# Patient Record
Sex: FEMALE | Race: WHITE | HISPANIC OR LATINO
[De-identification: names, ages, dates, MRNs, and addresses within clinical notes are randomized per-mention and may not be internally consistent; named-entity substitution may affect disease eponyms.]

---

## 2018-12-03 ENCOUNTER — TRANSCRIPTION ENCOUNTER (OUTPATIENT)
Age: 28
End: 2018-12-03

## 2023-11-30 ENCOUNTER — EMERGENCY (EMERGENCY)
Facility: HOSPITAL | Age: 33
LOS: 1 days | Discharge: HOME | End: 2023-11-30
Admitting: EMERGENCY MEDICINE
Payer: COMMERCIAL

## 2023-11-30 VITALS
DIASTOLIC BLOOD PRESSURE: 90 MMHG | TEMPERATURE: 98 F | HEART RATE: 82 BPM | SYSTOLIC BLOOD PRESSURE: 133 MMHG | RESPIRATION RATE: 18 BRPM | OXYGEN SATURATION: 100 %

## 2023-11-30 DIAGNOSIS — F41.9 ANXIETY DISORDER, UNSPECIFIED: ICD-10-CM

## 2023-11-30 DIAGNOSIS — F60.3 BORDERLINE PERSONALITY DISORDER: ICD-10-CM

## 2023-11-30 DIAGNOSIS — F32.9 MAJOR DEPRESSIVE DISORDER, SINGLE EPISODE, UNSPECIFIED: ICD-10-CM

## 2023-11-30 LAB
ALBUMIN SERPL ELPH-MCNC: 4.2 G/DL — SIGNIFICANT CHANGE UP (ref 3.3–5)
ALBUMIN SERPL ELPH-MCNC: 4.2 G/DL — SIGNIFICANT CHANGE UP (ref 3.3–5)
ALP SERPL-CCNC: 72 U/L — SIGNIFICANT CHANGE UP (ref 40–120)
ALP SERPL-CCNC: 72 U/L — SIGNIFICANT CHANGE UP (ref 40–120)
ALT FLD-CCNC: 12 U/L — SIGNIFICANT CHANGE UP (ref 4–33)
ALT FLD-CCNC: 12 U/L — SIGNIFICANT CHANGE UP (ref 4–33)
AMPHET UR-MCNC: NEGATIVE — SIGNIFICANT CHANGE UP
AMPHET UR-MCNC: NEGATIVE — SIGNIFICANT CHANGE UP
ANION GAP SERPL CALC-SCNC: 12 MMOL/L — SIGNIFICANT CHANGE UP (ref 7–14)
ANION GAP SERPL CALC-SCNC: 12 MMOL/L — SIGNIFICANT CHANGE UP (ref 7–14)
APAP SERPL-MCNC: <10 UG/ML — LOW (ref 15–25)
APAP SERPL-MCNC: <10 UG/ML — LOW (ref 15–25)
APPEARANCE UR: CLEAR — SIGNIFICANT CHANGE UP
APPEARANCE UR: CLEAR — SIGNIFICANT CHANGE UP
AST SERPL-CCNC: 25 U/L — SIGNIFICANT CHANGE UP (ref 4–32)
AST SERPL-CCNC: 25 U/L — SIGNIFICANT CHANGE UP (ref 4–32)
BACTERIA # UR AUTO: NEGATIVE /HPF — SIGNIFICANT CHANGE UP
BACTERIA # UR AUTO: NEGATIVE /HPF — SIGNIFICANT CHANGE UP
BARBITURATES UR SCN-MCNC: NEGATIVE — SIGNIFICANT CHANGE UP
BARBITURATES UR SCN-MCNC: NEGATIVE — SIGNIFICANT CHANGE UP
BASOPHILS # BLD AUTO: 0.04 K/UL — SIGNIFICANT CHANGE UP (ref 0–0.2)
BASOPHILS # BLD AUTO: 0.04 K/UL — SIGNIFICANT CHANGE UP (ref 0–0.2)
BASOPHILS NFR BLD AUTO: 0.7 % — SIGNIFICANT CHANGE UP (ref 0–2)
BASOPHILS NFR BLD AUTO: 0.7 % — SIGNIFICANT CHANGE UP (ref 0–2)
BENZODIAZ UR-MCNC: NEGATIVE — SIGNIFICANT CHANGE UP
BENZODIAZ UR-MCNC: NEGATIVE — SIGNIFICANT CHANGE UP
BILIRUB SERPL-MCNC: 0.4 MG/DL — SIGNIFICANT CHANGE UP (ref 0.2–1.2)
BILIRUB SERPL-MCNC: 0.4 MG/DL — SIGNIFICANT CHANGE UP (ref 0.2–1.2)
BILIRUB UR-MCNC: NEGATIVE — SIGNIFICANT CHANGE UP
BILIRUB UR-MCNC: NEGATIVE — SIGNIFICANT CHANGE UP
BUN SERPL-MCNC: 7 MG/DL — SIGNIFICANT CHANGE UP (ref 7–23)
BUN SERPL-MCNC: 7 MG/DL — SIGNIFICANT CHANGE UP (ref 7–23)
CALCIUM SERPL-MCNC: 9.4 MG/DL — SIGNIFICANT CHANGE UP (ref 8.4–10.5)
CALCIUM SERPL-MCNC: 9.4 MG/DL — SIGNIFICANT CHANGE UP (ref 8.4–10.5)
CAST: 0 /LPF — SIGNIFICANT CHANGE UP (ref 0–4)
CAST: 0 /LPF — SIGNIFICANT CHANGE UP (ref 0–4)
CHLORIDE SERPL-SCNC: 101 MMOL/L — SIGNIFICANT CHANGE UP (ref 98–107)
CHLORIDE SERPL-SCNC: 101 MMOL/L — SIGNIFICANT CHANGE UP (ref 98–107)
CO2 SERPL-SCNC: 24 MMOL/L — SIGNIFICANT CHANGE UP (ref 22–31)
CO2 SERPL-SCNC: 24 MMOL/L — SIGNIFICANT CHANGE UP (ref 22–31)
COCAINE METAB.OTHER UR-MCNC: NEGATIVE — SIGNIFICANT CHANGE UP
COCAINE METAB.OTHER UR-MCNC: NEGATIVE — SIGNIFICANT CHANGE UP
COLOR SPEC: YELLOW — SIGNIFICANT CHANGE UP
COLOR SPEC: YELLOW — SIGNIFICANT CHANGE UP
CREAT SERPL-MCNC: 0.6 MG/DL — SIGNIFICANT CHANGE UP (ref 0.5–1.3)
CREAT SERPL-MCNC: 0.6 MG/DL — SIGNIFICANT CHANGE UP (ref 0.5–1.3)
CREATININE URINE RESULT, DAU: 58 MG/DL — SIGNIFICANT CHANGE UP
CREATININE URINE RESULT, DAU: 58 MG/DL — SIGNIFICANT CHANGE UP
DIFF PNL FLD: NEGATIVE — SIGNIFICANT CHANGE UP
DIFF PNL FLD: NEGATIVE — SIGNIFICANT CHANGE UP
EGFR: 123 ML/MIN/1.73M2 — SIGNIFICANT CHANGE UP
EGFR: 123 ML/MIN/1.73M2 — SIGNIFICANT CHANGE UP
EOSINOPHIL # BLD AUTO: 0.03 K/UL — SIGNIFICANT CHANGE UP (ref 0–0.5)
EOSINOPHIL # BLD AUTO: 0.03 K/UL — SIGNIFICANT CHANGE UP (ref 0–0.5)
EOSINOPHIL NFR BLD AUTO: 0.5 % — SIGNIFICANT CHANGE UP (ref 0–6)
EOSINOPHIL NFR BLD AUTO: 0.5 % — SIGNIFICANT CHANGE UP (ref 0–6)
ETHANOL SERPL-MCNC: <10 MG/DL — SIGNIFICANT CHANGE UP
ETHANOL SERPL-MCNC: <10 MG/DL — SIGNIFICANT CHANGE UP
GLUCOSE SERPL-MCNC: 89 MG/DL — SIGNIFICANT CHANGE UP (ref 70–99)
GLUCOSE SERPL-MCNC: 89 MG/DL — SIGNIFICANT CHANGE UP (ref 70–99)
GLUCOSE UR QL: NEGATIVE MG/DL — SIGNIFICANT CHANGE UP
GLUCOSE UR QL: NEGATIVE MG/DL — SIGNIFICANT CHANGE UP
HCT VFR BLD CALC: 42.3 % — SIGNIFICANT CHANGE UP (ref 34.5–45)
HCT VFR BLD CALC: 42.3 % — SIGNIFICANT CHANGE UP (ref 34.5–45)
HGB BLD-MCNC: 13.9 G/DL — SIGNIFICANT CHANGE UP (ref 11.5–15.5)
HGB BLD-MCNC: 13.9 G/DL — SIGNIFICANT CHANGE UP (ref 11.5–15.5)
IANC: 3.86 K/UL — SIGNIFICANT CHANGE UP (ref 1.8–7.4)
IANC: 3.86 K/UL — SIGNIFICANT CHANGE UP (ref 1.8–7.4)
IMM GRANULOCYTES NFR BLD AUTO: 0.3 % — SIGNIFICANT CHANGE UP (ref 0–0.9)
IMM GRANULOCYTES NFR BLD AUTO: 0.3 % — SIGNIFICANT CHANGE UP (ref 0–0.9)
KETONES UR-MCNC: NEGATIVE MG/DL — SIGNIFICANT CHANGE UP
KETONES UR-MCNC: NEGATIVE MG/DL — SIGNIFICANT CHANGE UP
LEUKOCYTE ESTERASE UR-ACNC: ABNORMAL
LEUKOCYTE ESTERASE UR-ACNC: ABNORMAL
LYMPHOCYTES # BLD AUTO: 1.74 K/UL — SIGNIFICANT CHANGE UP (ref 1–3.3)
LYMPHOCYTES # BLD AUTO: 1.74 K/UL — SIGNIFICANT CHANGE UP (ref 1–3.3)
LYMPHOCYTES # BLD AUTO: 28.6 % — SIGNIFICANT CHANGE UP (ref 13–44)
LYMPHOCYTES # BLD AUTO: 28.6 % — SIGNIFICANT CHANGE UP (ref 13–44)
MCHC RBC-ENTMCNC: 28.4 PG — SIGNIFICANT CHANGE UP (ref 27–34)
MCHC RBC-ENTMCNC: 28.4 PG — SIGNIFICANT CHANGE UP (ref 27–34)
MCHC RBC-ENTMCNC: 32.9 GM/DL — SIGNIFICANT CHANGE UP (ref 32–36)
MCHC RBC-ENTMCNC: 32.9 GM/DL — SIGNIFICANT CHANGE UP (ref 32–36)
MCV RBC AUTO: 86.5 FL — SIGNIFICANT CHANGE UP (ref 80–100)
MCV RBC AUTO: 86.5 FL — SIGNIFICANT CHANGE UP (ref 80–100)
METHADONE UR-MCNC: NEGATIVE — SIGNIFICANT CHANGE UP
METHADONE UR-MCNC: NEGATIVE — SIGNIFICANT CHANGE UP
MONOCYTES # BLD AUTO: 0.4 K/UL — SIGNIFICANT CHANGE UP (ref 0–0.9)
MONOCYTES # BLD AUTO: 0.4 K/UL — SIGNIFICANT CHANGE UP (ref 0–0.9)
MONOCYTES NFR BLD AUTO: 6.6 % — SIGNIFICANT CHANGE UP (ref 2–14)
MONOCYTES NFR BLD AUTO: 6.6 % — SIGNIFICANT CHANGE UP (ref 2–14)
NEUTROPHILS # BLD AUTO: 3.86 K/UL — SIGNIFICANT CHANGE UP (ref 1.8–7.4)
NEUTROPHILS # BLD AUTO: 3.86 K/UL — SIGNIFICANT CHANGE UP (ref 1.8–7.4)
NEUTROPHILS NFR BLD AUTO: 63.3 % — SIGNIFICANT CHANGE UP (ref 43–77)
NEUTROPHILS NFR BLD AUTO: 63.3 % — SIGNIFICANT CHANGE UP (ref 43–77)
NITRITE UR-MCNC: NEGATIVE — SIGNIFICANT CHANGE UP
NITRITE UR-MCNC: NEGATIVE — SIGNIFICANT CHANGE UP
NRBC # BLD: 0 /100 WBCS — SIGNIFICANT CHANGE UP (ref 0–0)
NRBC # BLD: 0 /100 WBCS — SIGNIFICANT CHANGE UP (ref 0–0)
NRBC # FLD: 0 K/UL — SIGNIFICANT CHANGE UP (ref 0–0)
NRBC # FLD: 0 K/UL — SIGNIFICANT CHANGE UP (ref 0–0)
OPIATES UR-MCNC: NEGATIVE — SIGNIFICANT CHANGE UP
OPIATES UR-MCNC: NEGATIVE — SIGNIFICANT CHANGE UP
OXYCODONE UR-MCNC: NEGATIVE — SIGNIFICANT CHANGE UP
OXYCODONE UR-MCNC: NEGATIVE — SIGNIFICANT CHANGE UP
PCP SPEC-MCNC: SIGNIFICANT CHANGE UP
PCP SPEC-MCNC: SIGNIFICANT CHANGE UP
PCP UR-MCNC: NEGATIVE — SIGNIFICANT CHANGE UP
PCP UR-MCNC: NEGATIVE — SIGNIFICANT CHANGE UP
PH UR: 6 — SIGNIFICANT CHANGE UP (ref 5–8)
PH UR: 6 — SIGNIFICANT CHANGE UP (ref 5–8)
PLATELET # BLD AUTO: 345 K/UL — SIGNIFICANT CHANGE UP (ref 150–400)
PLATELET # BLD AUTO: 345 K/UL — SIGNIFICANT CHANGE UP (ref 150–400)
POTASSIUM SERPL-MCNC: 4.2 MMOL/L — SIGNIFICANT CHANGE UP (ref 3.5–5.3)
POTASSIUM SERPL-MCNC: 4.2 MMOL/L — SIGNIFICANT CHANGE UP (ref 3.5–5.3)
POTASSIUM SERPL-SCNC: 4.2 MMOL/L — SIGNIFICANT CHANGE UP (ref 3.5–5.3)
POTASSIUM SERPL-SCNC: 4.2 MMOL/L — SIGNIFICANT CHANGE UP (ref 3.5–5.3)
PROT SERPL-MCNC: 7.7 G/DL — SIGNIFICANT CHANGE UP (ref 6–8.3)
PROT SERPL-MCNC: 7.7 G/DL — SIGNIFICANT CHANGE UP (ref 6–8.3)
PROT UR-MCNC: NEGATIVE MG/DL — SIGNIFICANT CHANGE UP
PROT UR-MCNC: NEGATIVE MG/DL — SIGNIFICANT CHANGE UP
RBC # BLD: 4.89 M/UL — SIGNIFICANT CHANGE UP (ref 3.8–5.2)
RBC # BLD: 4.89 M/UL — SIGNIFICANT CHANGE UP (ref 3.8–5.2)
RBC # FLD: 12.2 % — SIGNIFICANT CHANGE UP (ref 10.3–14.5)
RBC # FLD: 12.2 % — SIGNIFICANT CHANGE UP (ref 10.3–14.5)
RBC CASTS # UR COMP ASSIST: 0 /HPF — SIGNIFICANT CHANGE UP (ref 0–4)
RBC CASTS # UR COMP ASSIST: 0 /HPF — SIGNIFICANT CHANGE UP (ref 0–4)
SALICYLATES SERPL-MCNC: <0.3 MG/DL — LOW (ref 15–30)
SALICYLATES SERPL-MCNC: <0.3 MG/DL — LOW (ref 15–30)
SARS-COV-2 RNA SPEC QL NAA+PROBE: SIGNIFICANT CHANGE UP
SARS-COV-2 RNA SPEC QL NAA+PROBE: SIGNIFICANT CHANGE UP
SODIUM SERPL-SCNC: 137 MMOL/L — SIGNIFICANT CHANGE UP (ref 135–145)
SODIUM SERPL-SCNC: 137 MMOL/L — SIGNIFICANT CHANGE UP (ref 135–145)
SP GR SPEC: 1.01 — SIGNIFICANT CHANGE UP (ref 1–1.03)
SP GR SPEC: 1.01 — SIGNIFICANT CHANGE UP (ref 1–1.03)
SQUAMOUS # UR AUTO: 4 /HPF — SIGNIFICANT CHANGE UP (ref 0–5)
SQUAMOUS # UR AUTO: 4 /HPF — SIGNIFICANT CHANGE UP (ref 0–5)
THC UR QL: NEGATIVE — SIGNIFICANT CHANGE UP
THC UR QL: NEGATIVE — SIGNIFICANT CHANGE UP
TOXICOLOGY SCREEN, DRUGS OF ABUSE, SERUM RESULT: SIGNIFICANT CHANGE UP
TOXICOLOGY SCREEN, DRUGS OF ABUSE, SERUM RESULT: SIGNIFICANT CHANGE UP
TSH SERPL-MCNC: 1.69 UIU/ML — SIGNIFICANT CHANGE UP (ref 0.27–4.2)
TSH SERPL-MCNC: 1.69 UIU/ML — SIGNIFICANT CHANGE UP (ref 0.27–4.2)
UROBILINOGEN FLD QL: 0.2 MG/DL — SIGNIFICANT CHANGE UP (ref 0.2–1)
UROBILINOGEN FLD QL: 0.2 MG/DL — SIGNIFICANT CHANGE UP (ref 0.2–1)
WBC # BLD: 6.09 K/UL — SIGNIFICANT CHANGE UP (ref 3.8–10.5)
WBC # BLD: 6.09 K/UL — SIGNIFICANT CHANGE UP (ref 3.8–10.5)
WBC # FLD AUTO: 6.09 K/UL — SIGNIFICANT CHANGE UP (ref 3.8–10.5)
WBC # FLD AUTO: 6.09 K/UL — SIGNIFICANT CHANGE UP (ref 3.8–10.5)
WBC UR QL: 4 /HPF — SIGNIFICANT CHANGE UP (ref 0–5)
WBC UR QL: 4 /HPF — SIGNIFICANT CHANGE UP (ref 0–5)

## 2023-11-30 PROCEDURE — 90792 PSYCH DIAG EVAL W/MED SRVCS: CPT

## 2023-11-30 PROCEDURE — 99285 EMERGENCY DEPT VISIT HI MDM: CPT

## 2023-11-30 PROCEDURE — 93010 ELECTROCARDIOGRAM REPORT: CPT

## 2023-11-30 NOTE — ED PROVIDER NOTE - NSFOLLOWUPINSTRUCTIONS_ED_ALL_ED_FT
Follow up with your PMD within 48-72 hrs. Show copies of your reports given to you.   Worsening, continued or new concerning symptoms return to the emergency department.    You have been given information necessary to follow up with the  [facility] ([facility]) Crisis center & other outpatient  psychiatric clinics within your community    • [facility] walk in Crisis centre  [address] [place] [address]  [phone] [email or web address]  Hours of operation:  Day	                                        Hours  Sunday                                  Closed  Monday                                9am - 3pm  Tuesday                                9am - 3pm  Wednesday                          9am - 3pm  Thursday                               9am - 3pm  Friday                                    9am - 3pm  Saturday                                Closed Follow up with your PMD within 48-72 hrs. Show copies of your reports given to you.   Worsening, continued or new concerning symptoms return to the emergency department.    You have been given information necessary to follow up with the  [facility] ([facility]) Crisis center & other outpatient  psychiatric clinics within your community    • [facility] walk in Crisis centre  [address] [place] [address]  [phone] [email or web address]  Hours of operation:  Day	                                        Hours  Sunday                                  Closed  Monday                                9am - 3pm  Tuesday                                9am - 3pm  Wednesday                          9am - 3pm  Thursday                               9am - 3pm  Friday                                    9am - 3pm  Saturday                                Closed Follow up with your PMD within 48-72 hrs. Show copies of your reports given to you.   Worsening, continued or new concerning symptoms return to the emergency department.    You have been given information necessary to follow up with the  [facility] ([facility]) Crisis center & other outpatient  psychiatric clinics within your community    • [facility] walk in Crisis centre  [address] [place] [address]  [phone] [email or web address]  Hours of operation:  Day	                                        Hours  Sunday                                  Closed  Monday                                9am - 3pm  Tuesday                                9am - 3pm  Wednesday                          9am - 3pm  Thursday                               9am - 3pm  Friday                                    9am - 3pm  Saturday                                Closed
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
## 2023-11-30 NOTE — ED BEHAVIORAL HEALTH ASSESSMENT NOTE - NS ED BHA AXIS I PRIMARY CODE FT
Azithromycin Pregnancy And Lactation Text: This medication is considered safe during pregnancy and is also secreted in breast milk. F32.9

## 2023-11-30 NOTE — ED BEHAVIORAL HEALTH ASSESSMENT NOTE - HPI (INCLUDE ILLNESS QUALITY, SEVERITY, DURATION, TIMING, CONTEXT, MODIFYING FACTORS, ASSOCIATED SIGNS AND SYMPTOMS)
Patient is a 33 year old  non-caregiver female currently residing in a private residence with  and in-laws. PPH MDD. No history of inpatient admissions. Reports 1 past suicide attempt via OD- at age 23? Per record history of suicide attempt at age 14. No history of violence. No substance use. PMH- fibroids & anemia. BIB self for suicidal ideation.    Met with patient she reports she came to the ED because she is feeling overwhelmed. She reports she has had a lot going on. She recently got  2 months ago but also had 8 weddings with 4 of them she was in over the last year. She is finishing her last year of clinicals at [employer] and getting her BSN, has to take her NCLEX in May and is trying to buy a house with her . She stated she feels overwhelmed with all these things she has to do. 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 She reports she was able to make herself feel better and her suicidal ideation resolved. She reports today she again had suicidal ideation, cannot identify trigger. She had thoughts to OD on an antibiotic (14 pills from a UTI in August 2023- she never took the medication). She reports she wrote a letter to her , texted him and then flushed the pills down the toilet and brought herself to the ED. She stated she called the suicide hotline on the way here "because I needed support." Patient stated she took the pills to flush them down the toilet because she was scared and that's why she brought herself to the ED. She reports she wants to feel better.     Patient reports often feeling anxious and having racing thoughts regarding all the tasks she has to complete, she endorses poor appetite, poor concentration and poor sleep. She stated she often wakes up in the middle of the night feeling anxious. She denied purging, purposeful food restriction, diet pills or laxative use. She endorsed feeling like she needs constant reassurance and if she cannot obtain that whether from her /preceptor she feels panicked and like she is failing. She endorses often having strong emotional reactions to others approval and described herself as a "people pleaser." She stated she feels like others  her. She reports she is attending class/clinical (except today) but currently has an 82 which she considers failing. She stated she normally has 95s in school. Patient currently denied SI/HI/SIB/intent/plan. 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     Patient denies any hallucinations, does not report any delusional thought content, denies thought insertion/withdrawal, denies referential thought processes & is not paranoid on interview. Pt is linear,logical, organized and well related. Patient does not report nor exhibit any signs of jarett, including irritable or elevated mood, grandiosity, pressured speech, risk-taking behaviors, increase in productivity or agitation.     Spoke with Neri - He reports patient did write a letter 1x before at age 23- and came to the hospital. He stated over the last years she will make suicidal statements on and off usually brought on by high anxiety, depression and emotionally reactivity to others. He stated  "how people may treat her/impact her mentally, she is very sensitive and when someone hurts her, it really hurts her". She has been attending school and tending to her ADLs. She last made a suicidal statement 1-2 months ago related to arguing. She went into their bedroom with im present and took a bottle of pills and  impulsively shoved them in her mouth in front of him. He then made her spit them out. He reports when she states these things he can usually calm her down. He reports today they were arguing via text - continued from an argument last night. The texts stopped and she then texted saying goodbye and left a letter. She then brought herself to the hospital. He stated he has no safety concerns, he wants her home and would take off work to be with her. Patient is a 33 year old  non-caregiver female currently residing in a private residence with  and in-laws. PPH MDD. No history of inpatient admissions. Reports 1 past suicide attempt via OD- at age 23? Per record history of suicide attempt at age 14. No history of violence. No substance use. PMH- uterine fibroids & anemia. BIB self for suicidal ideation.    Met with patient she reports she came to the ED because she is feeling overwhelmed. She reports she has had a lot going on. She recently got  2 months ago but also had 8 weddings with 4 of them she was in over the last year. She is finishing her last year of clinicals at [employer] and getting her BSN, has to take her NCLEX in May and is trying to buy a house with her . She stated she feels overwhelmed with all these things she has to do. 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 She reports she was able to make herself feel better and her suicidal ideation resolved. She reports today she again had suicidal ideation, cannot identify trigger. She had thoughts to OD on an antibiotic (14 pills from a UTI in August 2023- she never took the medication). She reports she flushed the pills down the toilet. Patient denies intention to take the pills and flushed them down the toilet because she was scared  and then wrote a letter to her  and texted him. She stated she wrote the letter "to get it all out." She then brought herself to the ED. She stated she called the suicide hotline on the way here "because I needed support." She stated she wants to feel better.     Patient reports often feeling anxious and having racing thoughts regarding all the tasks she has to complete, she endorses poor appetite, poor concentration and poor sleep. She stated she often wakes up in the middle of the night feeling anxious. She denied purging, purposeful food restriction, diet pills or laxative use. She endorsed feeling like she needs constant reassurance and if she cannot obtain that whether from her /preceptor she feels panicked and like she is failing. She endorses often having strong emotional reactions to others approval and described herself as a "people pleaser." She stated she feels like others  her. She reports she is attending class/clinical (except today) but currently has an 82 which she considers failing. She stated she normally has 95s in school. Patient currently denied SI/HI/SIB/intent/plan. 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     Patient denies any hallucinations, does not report any delusional thought content, denies thought insertion/withdrawal, denies referential thought processes & is not paranoid on interview. Pt is linear,logical, organized and well related. Patient does not report nor exhibit any signs of jarett, including irritable or elevated mood, grandiosity, pressured speech, risk-taking behaviors, increase in productivity or agitation.     Spoke with Neri - He reports patient did write a letter 1x before at age 23- and came to the hospital. He stated over the last years she will make suicidal statements on and off usually brought on by high anxiety, depression and emotionally reactivity to others. He stated  "how people may treat her/impact her mentally, she is very sensitive and when someone hurts her, it really hurts her". She has been attending school and tending to her ADLs. She last made a suicidal statement 1-2 months ago related to arguing. She went into their bedroom with im present and took a bottle of pills and  impulsively shoved them in her mouth in front of him. He then made her spit them out. He reports when she states these things he can usually calm her down. He reports today they were arguing via text - continued from an argument last night. The texts stopped and she then texted saying goodbye and left a letter. She then brought herself to the hospital. He stated he has no safety concerns, he wants her home and would take off work to be with her. Patient is a 33 year old  non-caregiver female currently residing in a private residence with  and in-laws. PPH MDD. No history of inpatient admissions. Reports 1 past suicide attempt via OD- at age 23? Per record history of suicide attempt at age 14. No history of violence. No substance use. PMH- uterine fibroids & anemia. BIB self for suicidal ideation.    Met with patient she reports she came to the ED because she is feeling overwhelmed. She reports she has had a lot going on. She recently got  2 months ago but also had 8 weddings with 4 of them she was in over the last year. She is finishing her last year of clinicals at [employer] and getting her BSN, has to take her NCLEX in May and is trying to buy a house with her . She stated she feels overwhelmed with all these things she has to do. 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 She reports she was able to make herself feel better and her suicidal ideation resolved. She reports today she again had suicidal ideation, cannot identify trigger. She admits to argument with  last night and today. She had thoughts to OD on an antibiotic (14 pills from a UTI in 2023- she never took the medication). She reports she flushed the pills down the toilet. Patient denies intention to take the pills and flushed them down the toilet because she was scared  and then wrote a letter to her  and texted him. She stated she wrote the letter "to get it all out." She then brought herself to the ED. She stated she called the suicide hotline on the way here "because I needed support." She stated she wants to feel better.     Patient reports often feeling anxious and having racing thoughts regarding all the tasks she has to complete, she endorses poor appetite, poor concentration and poor sleep. She stated she often wakes up in the middle of the night feeling anxious. She denied purging, purposeful food restriction, diet pills or laxative use. She endorsed feeling like she needs constant reassurance and if she cannot obtain that whether from her /preceptor she feels panicked and like she is failing. She endorses often having strong emotional reactions to others approval and described herself as a "people pleaser." She reports she has constant fear of abandonment and a void she is trying to fill. She stated she feels like others  her. She reports she is attending class/clinical (except today) but currently has an 82 which she considers failing. She stated she normally has 95s in school. 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 She stated she is regretful for her actions tonight and adamantly denied SI/HI/SIB/intent/plan.     Patient denies any hallucinations, does not report any delusional thought content, denies thought insertion/withdrawal, denies referential thought processes & is not paranoid on interview. Pt is linear,logical, organized and well related. Patient does not report nor exhibit any signs of jarett, including irritable or elevated mood, grandiosity, pressured speech, risk-taking behaviors, increase in productivity or agitation.     Spoke with Neri - Zenon reports patient did write a letter 1x before at age 23- and came to the hospital. He stated over the last years she will make suicidal statements on and off usually brought on by high anxiety, depression and emotionally reactivity to others. He stated  "how people may treat her/impact her mentally, she is very sensitive and when someone hurts her, it really hurts her". She has been attending school and tending to her ADLs. She last made a suicidal statement 1-2 months ago related to arguing. She went into their bedroom with im present and took a bottle of pills and  impulsively shoved them in her mouth in front of him. He then made her spit them out. He reports when she states these things he can usually calm her down. He reports today they were arguing via text - continued from an argument last night. The texts stopped and she then texted saying goodbye and left a letter. She then brought herself to the hospital. He stated he has no safety concerns, he wants her home and would take off work to be with her.    Patient confronted regarding  report of incident in October (stuck pills in mouth in front of  and then spit them out). She reports she acted impulsively and this was related to an argument and when a friend asked her about her experience in a [employer] educational program. She reports the program was hard for her and her  wanted her to do it so they could get  in a [employer]. She had to confess her sin of having an . Having to do the program is what led to the argument that evening. Patient is a 33 year old  non-caregiver female currently residing in a private residence with  and in-laws. PPH MDD. No history of inpatient admissions. Reports 1 past suicide attempt via OD- at age 23? Per record history of suicide attempt at age 14. No history of violence. No substance use. PMH- uterine fibroids & anemia. BIB self for suicidal ideation.    Met with patient she reports she came to the ED because she is feeling overwhelmed. She reports she has had a lot going on. She recently got  2 months ago but also had 8 weddings with 4 of them she was in over the last year. She is finishing her last year of clinicals at [employer] and getting her BSN, has to take her NCLEX in May and is trying to buy a house with her . She stated she feels overwhelmed with all these things she has to do. 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 She reports she was able to make herself feel better and her suicidal ideation resolved. She reports today she again had suicidal ideation, cannot identify trigger. She admits to argument with  last night and today. She had thoughts to OD on an antibiotic (14 pills from a UTI in 2023- she never took the medication). She reports she flushed the pills down the toilet. Patient denies intention to take the pills and flushed them down the toilet because she was scared  and then wrote a goodbye letter to her  and texted him. She stated she wrote the letter "to get it all out." She then brought herself to the ED. She stated she called the suicide hotline on the way here "because I needed support." She stated she wants to feel better.     Patient reports often feeling anxious and having racing thoughts regarding all the tasks she has to complete, she endorses poor appetite, poor concentration and poor sleep. She stated she often wakes up in the middle of the night feeling anxious. She denied purging, purposeful food restriction, diet pills or laxative use. She endorsed feeling like she needs constant reassurance and if she cannot obtain that whether from her /preceptor she feels panicked and like she is failing. She endorses often having strong emotional reactions to others approval and described herself as a "people pleaser." She reports she has constant fear of abandonment and a void she is trying to fill. She stated she feels like others  her. She reports she is attending class/clinical (except today) but currently has an 82 which she considers failing. She stated she normally has 95s in school. 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 She stated she is regretful for her actions tonight of writing the letter and adamantly denied SI/HI/SIB/intent/plan.     Patient denies any hallucinations, does not report any delusional thought content, denies thought insertion/withdrawal, denies referential thought processes & is not paranoid on interview. Pt is linear,logical, organized and well related. Patient does not report nor exhibit any signs of jarett, including irritable or elevated mood, grandiosity, pressured speech, risk-taking behaviors, increase in productivity or agitation.     Spoke with Neri - He reports patient did write a letter 1x before at age 23- and came to the hospital. He stated over the last years she will make suicidal statements on and off usually brought on by high anxiety, depression and emotionally reactivity to others. He stated  "how people may treat her/impact her mentally, she is very sensitive and when someone hurts her, it really hurts her". She has been attending school and tending to her ADLs. She last made a suicidal statement 1-2 months ago related to arguing. She went into their bedroom with im present and took a bottle of pills and  impulsively shoved them in her mouth in front of him. He then made her spit them out. He reports when she states these things he can usually calm her down. He reports today they were arguing via text - continued from an argument last night. The texts stopped and she then texted saying goodbye and that she left a letter. She then brought herself to the hospital. He stated he has no safety concerns, he wants her home and would take off work to be with her.    Patient confronted regarding  report of incident in October (stuck pills in mouth in front of  and then spit them out). She reports she acted impulsively and this was related to an argument and when a friend asked her about her experience in a [employer] educational program. She reports the program was hard for her and her  wanted her to do it so they could get  in a [employer]. She had to confess her sin of having an . Having to do the program is what led to the argument that evening. Patient is a 33 year old  non-caregiver female currently residing in a private residence with  and in-laws. PPH MDD. No history of inpatient admissions. Reports 1 past suicide attempt via OD- at age 23? Per record history of suicide attempt at age 14. No history of violence. No substance use. PMH- uterine fibroids & anemia. BIB self for suicidal ideation.    Met with patient she reports she came to the ED because she is feeling overwhelmed. She reports she has had a lot going on. She recently got  2 months ago but also had 8 weddings 4 of which she was in over the last year. She is finishing her last year of clinicals at [employer] and getting her BSN, has to take her NCLEX in May and is trying to buy a house with her . She stated she feels overwhelmed with all these things she has to do. 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 She reports she was able to make herself feel better and her suicidal ideation resolved. She reports today she again had suicidal ideation, cannot identify trigger. She admits to argument with  last night and today. She had thoughts to OD on an antibiotic (14 pills from a UTI in 2023- she never took the medication). She reports she flushed the pills down the toilet. Patient denies intention to take the pills and flushed them down the toilet because she was scared and then wrote a goodbye letter to her  and texted him. She stated she wrote the letter "to get it all out." She then brought herself to the ED. She stated she called the suicide hotline on the way here "because I needed support." She stated she wants to feel better.     Patient reports often feeling anxious and having racing thoughts regarding all the tasks she has to complete, she endorses poor appetite, poor concentration and poor sleep. She stated she often wakes up in the middle of the night feeling anxious. She denied purging, purposeful food restriction, diet pills or laxative use. She endorsed feeling like she needs constant reassurance and if she cannot obtain that whether from her /preceptor she feels panicked and like she is failing. She endorses often having strong emotional reactions to others approval and described herself as a "people pleaser." She reports she has constant fear of abandonment and a void she is trying to fill. She stated she feels like others  her. She reports she is attending class/clinical (except today) but currently has an 82 which she considers failing. She stated she normally has 95s in school. 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 She stated she is regretful for her actions tonight of writing the letter and adamantly denied SI/HI/SIB/intent/plan.     Patient denies any hallucinations, does not report any delusional thought content, denies thought insertion/withdrawal, denies referential thought processes & is not paranoid on interview. Pt is linear, logical, organized and well related. Patient does not report nor exhibit any signs of jarett, including irritable or elevated mood, grandiosity, pressured speech, risk-taking behaviors, increase in productivity or agitation.     Spoke with Neri - He reports patient did write a letter 1x before at age 23- and came to the hospital. He stated over the last years she will make suicidal statements on and off usually brought on by high anxiety, depression and emotionally reactivity to others. He stated  "how people may treat her/impact her mentally, she is very sensitive and when someone hurts her, it really hurts her". She has been attending school and tending to her ADLs. She last made a suicidal statement 1-2 months ago related to arguing. She went into their bedroom with im present and took a bottle of pills and  impulsively shoved them in her mouth in front of him. He then made her spit them out. He reports when she states these things he can usually calm her down. He reports today they were arguing via text - continued from an argument last night. The texts stopped and she then texted saying goodbye and that she left a letter. She then brought herself to the hospital. He stated he has no safety concerns, he wants her home and would take off work to be with her.    Patient confronted regarding  report of incident in October (stuck pills in mouth in front of  and then spit them out). She reports she acted impulsively and this was related to an argument and when a friend asked her about her experience in a [employer] educational program. She reports the program was hard for her and her  wanted her to do it so they could get  in a [employer]. She had to confess her sin of having an . Having to do the program is what led to the argument that evening.

## 2023-11-30 NOTE — ED ADULT NURSE NOTE - OBJECTIVE STATEMENT
Patient to  area for suicidal ideations with a plan. Patient is calm and cooperative. Patient evaluated by medical provider and waiting for psych evaluation.  JANNA Powell

## 2023-11-30 NOTE — ED ADULT TRIAGE NOTE - BP NONINVASIVE DIASTOLIC (MM HG)
90 Note Text (......Xxx Chief Complaint.): This diagnosis correlates with the Other (Free Text): Resolved VV Detail Level: Zone

## 2023-11-30 NOTE — ED ADULT TRIAGE NOTE - CHIEF COMPLAINT QUOTE
Pt reporting to the ED for S/I with plan to take prescribed antibiotics to attempt to over dose. Did not take medication. previous S/I attempt at 23 years old. Denies H/I, Drug or ETOH use. no auditory of visual hallucinations. pmh of anxiety and depression, not taking medication at this time.

## 2023-11-30 NOTE — ED BEHAVIORAL HEALTH ASSESSMENT NOTE - VETERAN
Is This A New Presentation, Or A Follow-Up?: Skin Lesion How Severe Is Your Skin Lesion?: mild Have Your Skin Lesions Been Treated?: not been treated No

## 2023-11-30 NOTE — ED BEHAVIORAL HEALTH ASSESSMENT NOTE - SUMMARY
Patient is a 33 year old  non-caregiver female currently residing in a private residence with  and in-laws. PPH MDD. No history of inpatient admissions. Reports 1 past suicide attempt via OD- at age 23? Per record history of suicide attempt at age 14. No history of violence. No substance use. PMH- uterine fibroids & anemia. BIB self for suicidal ideation.    Patient presents to the ED in the context of suicidal ideation. She endorses having suicidal ideation today to OD on pills. She reports she flushed the pills down the toilet. She then wrote a goodbye letter to her , texted him  and then came to the ED. She stated she wants to get treatment. Patient adamantly denies SI/HI/SIB/intent/plan currently. She is remorseful for her actions ie: writing the letter/texting her . She describes symptoms consistent with MDD and likely DANNY.  and patient also describe symptoms which are likely consistent with character pathology such as: strong emotional reactions/ emotional reactivity to negative interactions others which often results in suicidal ideation, feelings of abandonment/emptiness and history of provocative suicidal behavior.     Patient at this time is future oriented and able to safety plan. She is able to function- attending school and caring for self. She does not present with nor endorse symptoms of jarett, psychosis or hypomania.  has no safety concerns. Patient was offered and refused inpatient hospitalization. She does not meet criteria for involuntary admission and  is requesting patient home. Patient to be discharged and Follow up with  referral/[facility].     Extensive safety planning performed with patient and family. In addition to extensive discussion of safe places patient can go to, distraction techniques, coping skill and who patient can call in the event of crisis including various hotlines. Patient and family agreeing verbally to return patient to ER or call 911 if symptoms worsen or patient has urges to harm self or others.

## 2023-11-30 NOTE — ED BEHAVIORAL HEALTH ASSESSMENT NOTE - DETAILS
see hpi bullied in HS- dropped out and obtained GED, Father left when patient was 8, history of  in 2019 self referred;  see above sister- depression/anxiety, brother- ADHD (ritalin)

## 2023-11-30 NOTE — ED BEHAVIORAL HEALTH ASSESSMENT NOTE - SAFETY PLAN ADDT'L DETAILS
Safety plan discussed with.../Education provided regarding environmental safety / lethal means restriction/Provision of National Suicide Prevention Lifeline 7-247-638-SWSD (5269)
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
## 2023-11-30 NOTE — ED BEHAVIORAL HEALTH ASSESSMENT NOTE - NSBHATTESTCOMMENTATTENDFT_PSY_A_CORE
Patient is a 33 year old  non-caregiver female currently residing in a private residence with  and in-laws. PPH MDD. No history of inpatient admissions. Reports 1 past suicide attempt via OD- at age 23? Per record history of suicide attempt at age 14. No history of violence. No substance use. PMH- uterine fibroids & anemia. BIB self for suicidal ideation.    Patient endorses depressed mood and suicidal ideations in the setting of psychosocial stressors.  Acute symptoms have resolved. Future oriented and identified protective factors and coping skills. Patient denies symptoms of jarett, psychosis, suicidal/homicidal ideations, intent or plans, denies auditory/visual hallucinations.  Patient does not represent an imminent threat of danger to self or others at this time.  Patient does not meet criteria for inpatient involuntary hospitalization.  Patient will be discharged home and agrees to discharge disposition.  No acute safety concerns.  Feels safe returning home/to the community. Psychoeducation provided. Safety plan discussed.

## 2023-11-30 NOTE — ED ADULT NURSE NOTE - NSFALLUNIVINTERV_ED_ALL_ED
[Follow Up] : a follow up visit [Patient] : patient [Mother] : mother [FreeTextEntry1] : s/p right knee arthroscopic exploration and lateral release for right patellar instability  Bed/Stretcher in lowest position, wheels locked, appropriate side rails in place/Call bell, personal items and telephone in reach/Instruct patient to call for assistance before getting out of bed/chair/stretcher/Non-slip footwear applied when patient is off stretcher/[place] to call system/Physically safe environment - no spills, clutter or unnecessary equipment/Purposeful proactive rounding/Room/bathroom lighting operational, light cord in reach

## 2023-11-30 NOTE — ED BEHAVIORAL HEALTH ASSESSMENT NOTE - RISK ASSESSMENT
modifiable risk factors- depression, anxiety, poor frustration tolerance, history of suicidal statements related to strong emotional reactions/anxiety    unmodifiable risk factors- history of trauma, history of suicide attempts/provocative bx     protective factors- no SI/HI/SIB/intent/plan, able to safety plan, future oriented, no jarett/hypomania, tending to ADLs, supportive family, no violence/aggression, no legal issues, no history of inpatient admissions

## 2023-11-30 NOTE — ED PROVIDER NOTE - PATIENT PORTAL LINK FT
You can access the FollowMyHealth Patient Portal offered by [facility] by registering at the following website: [email or web address]. By joining [employer]’s FollowMyHealth portal, you will also be able to view your health information using other applications (apps) compatible with our system. You can access the FollowMyHealth Patient Portal offered by [facility] by registering at the following website: [email or web address]. By joining [employer]’s FollowMyHealth portal, you will also be able to view your health information using other applications (apps) compatible with our system. You can access the FollowMyHealth Patient Portal offered by [facility] by registering at the following website: [email or web address]. By joining [employer]’s FollowMyHealth portal, you will also be able to view your health information using other applications (apps) compatible with our system.

## 2023-11-30 NOTE — ED ADULT NURSE NOTE - NS ED NURSE RECORD ANOTHER HT AND WT
60 year old female presents to the emergency department with a chief complaint of dyspnea.    HPI       The patient underwent bronchoscopy yesterday with Dr Crespo. She felt fine afterwards. Late this past night she developed a sense of dyspnea. She describes increased respiratory rate, feeling like not getting a deep breath. She developed numb fingers and toes. Some chest pressure and sense of weakness. This went on several hours and she decided to call 911. She is brought in via EMS. Mild tachycardia but otherwise vitals stable. Patient placed on oxymask. She does not wear oxygen at home.      Yesterday:   1. Bronchoscopy for inspection of the airway.  2. Bronchoalveolar lavage of the right lower lobe.    Found to have some tracheal stenosis with plan for follow up bronch/dilation.    No fever chills, no nausea/vomiting. No abdominal pain.        ALLERGIES:   Allergen Reactions   • Lisinopril Cough   • Penicillins SWELLING       Prior to Admission Medications    ACETAMINOPHEN (TYLENOL) 500 MG TABLET    Take 500 mg by mouth every 6 hours as needed for Pain.    ALBUTEROL (PROAIR HFA) 108 (90 BASE) MCG/ACT INHALER    Inhale 2 puffs into the lungs every 4 hours as needed for Shortness of Breath or Wheezing.    ASPIRIN 81 MG TABLET    Take 1 tablet by mouth daily.    CARVEDILOL (COREG) 6.25 MG TABLET    Take 1 tablet by mouth 2 times daily (with meals).    CETIRIZINE (ZYRTEC ALLERGY) 10 MG TABLET    Take 10 mg by mouth daily.    CHOLECALCIFEROL (VITAMIN D) 2000 UNITS CAPSULE    Take 2,000 Units by mouth daily.    COENZYME Q10 (CO Q 10) 100 MG CAP    Take 1 capsule by mouth nightly.    CYANOCOBALAMIN (VITAMIN B 12 PO)    Take by mouth daily.    DULOXETINE (CYMBALTA) 60 MG CAPSULE    Take one capsule by mouth daily    EZETIMIBE (ZETIA) 10 MG TABLET    Take 1 tablet by mouth daily. @ [facility]    FLAXSEED, LINSEED, (FLAX SEED OIL) 1000 MG CAPSULE    Take 1,000 mg by mouth daily.    HYDROCODONE-ACETAMINOPHEN (NORCO) 5-325 MG  PER TABLET    Take 1-2 tablets by mouth every 6 hours as needed for Pain.    INSULIN REGULAR HUMAN, CONCENTRATED, (HUMULIN R) 500 UNIT/ML SOLUTION    Inject into the skin 2 times daily (before meals). Takes 20 units in am and 26- 32 units at hs per blood sugars    LEVOTHYROXINE (SYNTHROID, LEVOTHROID) 25 MCG TABLET    Take 1 tablet by mouth daily.    LOSARTAN (COZAAR) 50 MG TABLET    Take 1 tablet by mouth daily.    METFORMIN (GLUCOPHAGE-XR) 500 MG 24 HR TABLET    Take 2 tabs by mouth in AM and 2 tabs in evening.    MOMETASONE-FORMOTEROL (DULERA) 200-5 MCG/ACT INHALER    Inhale 2 puffs in the morning and 2 puffs at night.    MONTELUKAST (SINGULAIR) 10 MG TABLET    Take 1 tablet by mouth nightly.    NYSTATIN (MYCOSTATIN) POWDER    Apply to rash between buttocks and skin folds TID until healed.    OMEPRAZOLE (PRILOSEC) 40 MG CAPSULE    Take 1 capsule by mouth daily.    ONDANSETRON (ZOFRAN ODT) 4 MG DISINTEGRATING TABLET    Take 1 tablet by mouth every 8 hours as needed for Nausea.    PRAVASTATIN (PRAVACHOL) 40 MG TABLET    One tablet qhs along with COQ10    PREDNISONE (DELTASONE) 10 MG TABLET    Take 10 mg by mouth daily. X 4 days  Last 2-/    PREDNISONE (DELTASONE) 5 MG TABLET    Take 5 mg by mouth daily. To start on 2-    TIOTROPIUM BROMIDE MONOHYDRATE 1.25 MCG/ACT AERO SOLN    Inhale 2 inhalations into the lungs daily.    TIZANIDINE (ZANAFLEX) 4 MG TABLET    Take 1 tablet by mouth every 6 hours as needed (spasm).       Past Medical History   Diagnosis Date   • Ankle fracture      status post   • Anxiety    • Asthma    • CAD (coronary artery disease)    • CAD (coronary artery disease)    • Chest pain, unspecified 4/16/2014     continued med management of CAD   • COPD (chronic obstructive pulmonary disease)    • Coronary atherosclerosis of unspecified type of vessel, native or graft 2009, 2011     mod. obstructive LAD disease   • Depression    • Diabetes mellitus, type 2    • Diabetic peripheral  neuropathy 2/23/2015   • Diastolic dysfunction, left ventricle    • Fibromyalgia 2/23/2015   • Generalized anxiety disorder    • GERD (gastroesophageal reflux disease)    • Hypertension    • Hypothyroidism    • Ischemic cardiomyopathy    • Ischemic cardiomyopathy 4/25/2014   • Lumbago    • Morbid obesity    • Obstructive chronic bronchitis without exacerbation 4/25/2014   • JOSE MIGUEL (obstructive sleep apnea) 11/14/2012     not compliant to nasal bipap   • JOSE MIGUEL on CPAP    • Osteoarthritis      psoriatic and osteoarthritis       Past Surgical History   Procedure Laterality Date   • Colonoscopy w/ polypectomy  11/10/2011   • Coronary angiogram - cv  02/11/2011     Cardiac Cath   • Tonsillectomy and adenoidectomy  age 14   • Ankle scope,aid repair fx,bone defct       Left   • D and c  2006     Calcified mass in uterus   • Coronary angiogram - cv  2009, 2011, 2014     mod. obstr. LAD disease--med treatment   • Esophagogastroduodenoscopy  10/28/2015     dysphagia , GERD , gastris   • Cataract extraction, bilateral  5/2016     bilateral   • Colonoscopy       colonoscopy due 11/2021   • Bronchoscopy,bx bronch/endobro  2/14/2017         • Bronchoscopy,diagnostic w lavage  2/14/2017         • Mod sed same phys/qhp initial 15 mins 5/> yrs  2/14/2017             Family History   Problem Relation Age of Onset   • Heart disease Mother    • Arthritis Mother    • COPD Mother    • Diabetes Mother    • Heart disease Father    • Cancer Father    • Cancer Sister        Social History   Substance Use Topics   • Smoking status: Passive Smoke Exposure - Never Smoker   • Smokeless tobacco: Never Used   • Alcohol use No      Comment: once per year       Review of Systems  A complete review of system is otherwise negative, non-pertinent, or non-contributory unless otherwise noted in HPI.    Physical Exam    ED Triage Vitals   ED Triage Vitals Group      Temp --       Pulse 02/15/17 0433 118      Resp 02/15/17 0433 30      BP 02/15/17 0433 152/67       SpO2 02/15/17 0433 96 %      EtCO2 mmHg --       Height --       Weight --       Weight Scale Used --       96% as above is on an oxygen mask which is placed  The patient is awake alert and in no apparent distress. She has tachypnea.  Vital signs are otherwise reviewed per nurse's notes above  Respiratory effort is no particularly labored although tachypnea. Breath sounds are bit diminished but good air movement bilaterally. No localized wheezes rales or rhonchi  Cardiac exam mildly tachycardic regular without murmur  Abdomen obese soft nontender active bowel sounds present  Skin without rash without pallor or diaphoresis  Neuro: Cranial nerves are intact. Speech is clear.  Musculoskeletal: No evidence trauma head neck or back.      MDM    Results for orders placed or performed during the hospital encounter of 02/15/17   CBC & Auto Differential   Result Value Ref Range    WBC 12.1 (H) 4.2 - 11.0 K/mcL    RBC 4.86 4.00 - 5.20 mil/mcL    HGB 13.5 12.0 - 15.5 g/dL    HCT 42.4 36.0 - 46.5 %    MCV 87.2 78.0 - 100.0 fl    MCH 27.8 26.0 - 34.0 pg    MCHC 31.8 (L) 32.0 - 36.5 g/dL    RDW-CV 14.5 11.0 - 15.0 %     140 - 450 K/mcL    DIFF TYPE AUTOMATED DIFFERENTIAL     Neutrophil 82 %    LYMPH 11 %    MONO 7 %    EOSIN 0 %    BASO 0 %    Absolute Neutrophil 9.8 (H) 1.8 - 7.7 K/mcL    Absolute Lymph 1.4 1.0 - 4.0 K/mcL    Absolute Mono 0.9 0.3 - 0.9 K/mcL    Absolute Eos 0.0 (L) 0.1 - 0.5 K/mcL    Absolute Baso 0.0 0.0 - 0.3 K/mcL   Comprehensive Metabolic Panel   Result Value Ref Range    Sodium 135 135 - 145 mmol/L    Potassium 4.2 3.4 - 5.1 mmol/L    Chloride 98 98 - 107 mmol/L    Carbon Dioxide 28 21 - 32 mmol/L    Anion Gap 13 10 - 20 mmol/L    Glucose 473 (HH) 65 - 99 mg/dL    BUN 13 10 - 20 mg/dL    Creatinine 0.93 0.51 - 0.95 mg/dL    GFR Estimate,  79     GFR Estimate, Non African American 68     BUN/Creatinine Ratio 14 7 - 25    CALCIUM 9.0 8.4 - 10.2 mg/dL    TOTAL BILIRUBIN 0.7 0.2 -  1.0 mg/dL    AST/SGOT 29 <38 Units/L    ALT/SGPT 44 <79 Units/L    ALK PHOSPHATASE 76 45 - 117 Units/L    TOTAL PROTEIN 6.7 6.4 - 8.2 g/dL    Albumin 2.7 (L) 3.6 - 5.1 g/dL    GLOBULIN 4.0 2.0 - 4.0 g/dL    A/G Ratio, Serum 0.7 (L) 1.0 - 2.4   Troponin I Ultra Sensitive   Result Value Ref Range    TROPONIN I <0.02 <0.05 ng/mL       EKG was ordered, reviewed and interpreted by myself, Dr. Hogan.  Sinus tachycardia. Low voltage QRS. No acute ST-T wave abnormality.      XR CHEST AP OR PA - PORTABLE    (Results Pending)     I am the primary reader of the chest x-ray. There are no infiltrates. There is no evidence of pulmonary edema. No pneumothorax.    The patient is observed. She is given duoneb abd IV solumedrol. Her resting heart rate remained in the upper one teens. She feels back to her baseline dyspnea. She is running around 88-90% on room air which she states is typically normal for her. I have discussed the patient's care at length with Dr. Crespo. His thoughts at this point were primarily focused on supportive measures. She has had several CT scans looking for pulmonary embolus in the past all of which been negative. He did not feel that this would be likely given her history. He does have plans to go back for intervention for the tracheal stenosis.    Diagnosis: Acute dyspnea    At the present time the patient is being monitored carefully. We will see where her heart rate in her overall sense of well-being is following the insulinand a liter saline. The patient is comfortable with this plan. Please refer to Dr Velez's note regarding disposition.           Sam Hogan MD  02/16/17 [id number]     No

## 2023-11-30 NOTE — ED PROVIDER NOTE - CARE PLAN
1 Principal Discharge DX:	MDD (major depressive disorder)  Secondary Diagnosis:	Borderline personality disorder

## 2023-11-30 NOTE — ED BEHAVIORAL HEALTH ASSESSMENT NOTE - NSBHATTESTAPPAMEND_PSY_A_CORE
I have personally seen and examined this patient. I fully participated in the care of this patient. I have made amendments to the documentation where appropriate and otherwise agree with the history, physical exam, and plan as documented by the DOMI

## 2023-11-30 NOTE — ED BEHAVIORAL HEALTH ASSESSMENT NOTE - OTHER PAST PSYCHIATRIC HISTORY (INCLUDE DETAILS REGARDING ONSET, COURSE OF ILLNESS, INPATIENT/OUTPATIENT TREATMENT)
PPH MDD. No history of inpatient admissions. Reports 1 past suicide attempt via OD- at age 23? Per record history of suicide attempt at age 14. Hx of therapy- denies having a consistent therapist.

## 2023-11-30 NOTE — ED BEHAVIORAL HEALTH ASSESSMENT NOTE - DESCRIPTION
During course of ED visit patient was calm and cooperative. Patient was not aggressive or violent and did not require PRN medications.    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

## 2023-12-01 NOTE — ED BEHAVIORAL HEALTH NOTE - BEHAVIORAL HEALTH NOTE
HIGH RISK LOG Writer called  patient states she's 80% better and getting over the shock of what she went through yesterday. States she wants something closer to [place] than [place].  Referral faxed to [facility] in [place].

## 2023-12-05 NOTE — ED BEHAVIORAL HEALTH NOTE - BEHAVIORAL HEALTH NOTE
urgent referral follow up:    received message from [facility] from 12/1.  Writer called  option 1 for new patient coordinator, left a voicemail requesting callback to social work phone

## 2023-12-06 NOTE — ED BEHAVIORAL HEALTH NOTE - BEHAVIORAL HEALTH NOTE
Matar spoke with New patient coordinator Austin molina who says referral was okay and they have availability for medication management but there is a 1500 person wait list for therapy. He advised writer to speak w/ pt and have the pt call to confirm. HE says if pt wants therapy she can be seen at [facility] for medication management and find a private therapist.     spoke w/ pt ([phone]) and informed her about [facility] psychiatry. She is in agreement to call and schedule an apt. she is also requesting a list of private therapist.  emailed therapist to [email or web address] Matar spoke with New patient coordinator Austin molina who says referral was okay and they have availability for medication management but there is a 1500 person wait list for therapy. He advised writer to speak w/ pt and have the pt call to confirm. HE says if pt wants therapy she can be seen at [facility] for medication management and find a private therapist.     spoke w/ pt ([phone]) and informed her about [facility] psychiatry. She is in agreement to call and schedule an apt. she is also requesting a list of private therapist.  emailed therapist to [email or web address]

## 2025-04-08 ENCOUNTER — EMERGENCY (EMERGENCY)
Facility: HOSPITAL | Age: 35
LOS: 0 days | Discharge: HOME | End: 2025-04-09
Attending: EMERGENCY MEDICINE
Payer: COMMERCIAL

## 2025-04-08 VITALS
TEMPERATURE: 98 F | DIASTOLIC BLOOD PRESSURE: 84 MMHG | HEIGHT: 66 IN | SYSTOLIC BLOOD PRESSURE: 122 MMHG | RESPIRATION RATE: 18 BRPM | OXYGEN SATURATION: 100 % | HEART RATE: 83 BPM | WEIGHT: 125 LBS

## 2025-04-08 DIAGNOSIS — R45.851 SUICIDAL IDEATIONS: ICD-10-CM

## 2025-04-08 DIAGNOSIS — F43.29 ADJUSTMENT DISORDER WITH OTHER SYMPTOMS: ICD-10-CM

## 2025-04-08 DIAGNOSIS — F32.A DEPRESSION, UNSPECIFIED: ICD-10-CM

## 2025-04-08 LAB
BASOPHILS # BLD AUTO: 0.03 K/UL — SIGNIFICANT CHANGE UP (ref 0–0.2)
BASOPHILS NFR BLD AUTO: 0.6 % — SIGNIFICANT CHANGE UP (ref 0–2)
EOSINOPHIL # BLD AUTO: 0.09 K/UL — SIGNIFICANT CHANGE UP (ref 0–0.5)
EOSINOPHIL NFR BLD AUTO: 1.7 % — SIGNIFICANT CHANGE UP (ref 0–6)
HCT VFR BLD CALC: 35.1 % — SIGNIFICANT CHANGE UP (ref 34.5–45)
HGB BLD-MCNC: 11.4 G/DL — LOW (ref 11.5–15.5)
IMM GRANULOCYTES # BLD AUTO: 0.01 K/UL — SIGNIFICANT CHANGE UP (ref 0–0.07)
IMM GRANULOCYTES NFR BLD AUTO: 0.2 % — SIGNIFICANT CHANGE UP (ref 0–0.9)
LYMPHOCYTES # BLD AUTO: 1.62 K/UL — SIGNIFICANT CHANGE UP (ref 1–3.3)
LYMPHOCYTES NFR BLD AUTO: 30.2 % — SIGNIFICANT CHANGE UP (ref 13–44)
MCHC RBC-ENTMCNC: 29.2 PG — SIGNIFICANT CHANGE UP (ref 27–34)
MCHC RBC-ENTMCNC: 32.5 G/DL — SIGNIFICANT CHANGE UP (ref 32–36)
MCV RBC AUTO: 89.8 FL — SIGNIFICANT CHANGE UP (ref 80–100)
MONOCYTES # BLD AUTO: 0.42 K/UL — SIGNIFICANT CHANGE UP (ref 0–0.9)
MONOCYTES NFR BLD AUTO: 7.8 % — SIGNIFICANT CHANGE UP (ref 2–14)
NEUTROPHILS # BLD AUTO: 3.2 K/UL — SIGNIFICANT CHANGE UP (ref 1.8–7.4)
NEUTROPHILS NFR BLD AUTO: 59.5 % — SIGNIFICANT CHANGE UP (ref 43–77)
NRBC # BLD AUTO: 0 K/UL — SIGNIFICANT CHANGE UP (ref 0–0)
NRBC # FLD: 0 K/UL — SIGNIFICANT CHANGE UP (ref 0–0)
NRBC BLD AUTO-RTO: 0 /100 WBCS — SIGNIFICANT CHANGE UP (ref 0–0)
PLATELET # BLD AUTO: 283 K/UL — SIGNIFICANT CHANGE UP (ref 150–400)
PMV BLD: 9.7 FL — SIGNIFICANT CHANGE UP (ref 7–13)
RBC # BLD: 3.91 M/UL — SIGNIFICANT CHANGE UP (ref 3.8–5.2)
RBC # FLD: 12.8 % — SIGNIFICANT CHANGE UP (ref 10.3–14.5)
WBC # BLD: 5.37 K/UL — SIGNIFICANT CHANGE UP (ref 3.8–10.5)
WBC # FLD AUTO: 5.37 K/UL — SIGNIFICANT CHANGE UP (ref 3.8–10.5)

## 2025-04-08 PROCEDURE — 80053 COMPREHEN METABOLIC PANEL: CPT

## 2025-04-08 PROCEDURE — 99284 EMERGENCY DEPT VISIT MOD MDM: CPT

## 2025-04-08 PROCEDURE — 99284 EMERGENCY DEPT VISIT MOD MDM: CPT | Mod: 25

## 2025-04-08 PROCEDURE — 0241U: CPT

## 2025-04-08 PROCEDURE — 80307 DRUG TEST PRSMV CHEM ANLYZR: CPT

## 2025-04-08 PROCEDURE — 85025 COMPLETE CBC W/AUTO DIFF WBC: CPT

## 2025-04-08 PROCEDURE — 36415 COLL VENOUS BLD VENIPUNCTURE: CPT

## 2025-04-08 PROCEDURE — 93005 ELECTROCARDIOGRAM TRACING: CPT

## 2025-04-08 PROCEDURE — 81001 URINALYSIS AUTO W/SCOPE: CPT

## 2025-04-08 PROCEDURE — 81025 URINE PREGNANCY TEST: CPT

## 2025-04-08 NOTE — ED ADULT TRIAGE NOTE - AS TEMP SITE
oral Render In Strict Bullet Format?: No Detail Level: Zone Continue Regimen: Epiduo Forte 0.3 %-2.5 % topical gel with pump \\nQuantity: 45.0 g  Days Supply: 30\\nSig: Apply a pea size amount topically to entire face once nightly

## 2025-04-08 NOTE — ED ADULT TRIAGE NOTE - CHIEF COMPLAINT QUOTE
Patient BIB ems from home c/o SI. Patient reports that around 10:30pm patient was feeling overwhelmed and anxious due to stressors in her job, home, and marriage. These feelings led to suicidal thought and patient put half a bottle of motrin into her mouth with the intention of making a suicide attempt but spit all of the pills back out. Denies swallowing any of the motrin pills. Denies HI and history of HI. States that about 1 year ago she had SI. Does not take any psych medications. 1:1 initiated in triage

## 2025-04-09 VITALS
OXYGEN SATURATION: 100 % | TEMPERATURE: 98 F | RESPIRATION RATE: 18 BRPM | SYSTOLIC BLOOD PRESSURE: 106 MMHG | HEART RATE: 77 BPM | DIASTOLIC BLOOD PRESSURE: 62 MMHG

## 2025-04-09 DIAGNOSIS — F43.29 ADJUSTMENT DISORDER WITH OTHER SYMPTOMS: ICD-10-CM

## 2025-04-09 LAB
ALBUMIN SERPL ELPH-MCNC: 3.6 G/DL — SIGNIFICANT CHANGE UP (ref 3.3–5)
ALP SERPL-CCNC: 53 U/L — SIGNIFICANT CHANGE UP (ref 40–120)
ALT FLD-CCNC: 17 U/L — SIGNIFICANT CHANGE UP (ref 12–78)
AMPHET UR-MCNC: NEGATIVE — SIGNIFICANT CHANGE UP
ANION GAP SERPL CALC-SCNC: 6 MMOL/L — SIGNIFICANT CHANGE UP (ref 5–17)
APAP SERPL-MCNC: 11 UG/ML — SIGNIFICANT CHANGE UP (ref 10–30)
APPEARANCE UR: CLEAR — SIGNIFICANT CHANGE UP
AST SERPL-CCNC: 20 U/L — SIGNIFICANT CHANGE UP (ref 15–37)
BACTERIA # UR AUTO: ABNORMAL /HPF
BARBITURATES UR SCN-MCNC: NEGATIVE — SIGNIFICANT CHANGE UP
BENZODIAZ UR-MCNC: NEGATIVE — SIGNIFICANT CHANGE UP
BILIRUB SERPL-MCNC: 0.2 MG/DL — SIGNIFICANT CHANGE UP (ref 0.2–1.2)
BILIRUB UR-MCNC: NEGATIVE — SIGNIFICANT CHANGE UP
BUN SERPL-MCNC: 9 MG/DL — SIGNIFICANT CHANGE UP (ref 7–23)
CALCIUM SERPL-MCNC: 8.9 MG/DL — SIGNIFICANT CHANGE UP (ref 8.5–10.1)
CAST: 1 /LPF — SIGNIFICANT CHANGE UP (ref 0–4)
CHLORIDE SERPL-SCNC: 108 MMOL/L — SIGNIFICANT CHANGE UP (ref 96–108)
CO2 SERPL-SCNC: 23 MMOL/L — SIGNIFICANT CHANGE UP (ref 22–31)
COCAINE METAB.OTHER UR-MCNC: NEGATIVE — SIGNIFICANT CHANGE UP
COLOR SPEC: YELLOW — SIGNIFICANT CHANGE UP
CREAT SERPL-MCNC: 0.65 MG/DL — SIGNIFICANT CHANGE UP (ref 0.5–1.3)
DIFF PNL FLD: NEGATIVE — SIGNIFICANT CHANGE UP
EGFR: 120 ML/MIN/1.73M2 — SIGNIFICANT CHANGE UP
EGFR: 120 ML/MIN/1.73M2 — SIGNIFICANT CHANGE UP
ETHANOL SERPL-MCNC: 12 MG/DL — HIGH (ref 0–10)
FENTANYL UR QL SCN: NEGATIVE — SIGNIFICANT CHANGE UP
FLUAV AG NPH QL: SIGNIFICANT CHANGE UP
FLUBV AG NPH QL: SIGNIFICANT CHANGE UP
GLUCOSE SERPL-MCNC: 89 MG/DL — SIGNIFICANT CHANGE UP (ref 70–99)
GLUCOSE UR QL: NEGATIVE MG/DL — SIGNIFICANT CHANGE UP
KETONES UR-MCNC: NEGATIVE MG/DL — SIGNIFICANT CHANGE UP
LEUKOCYTE ESTERASE UR-ACNC: ABNORMAL
METHADONE UR-MCNC: NEGATIVE — SIGNIFICANT CHANGE UP
NITRITE UR-MCNC: NEGATIVE — SIGNIFICANT CHANGE UP
OPIATES UR-MCNC: NEGATIVE — SIGNIFICANT CHANGE UP
PCP SPEC-MCNC: SIGNIFICANT CHANGE UP
PCP UR-MCNC: NEGATIVE — SIGNIFICANT CHANGE UP
PH UR: 5.5 — SIGNIFICANT CHANGE UP (ref 5–8)
POCT URINE PREGNANCY TEST: NEGATIVE — SIGNIFICANT CHANGE UP
POTASSIUM SERPL-MCNC: 4.1 MMOL/L — SIGNIFICANT CHANGE UP (ref 3.5–5.3)
POTASSIUM SERPL-SCNC: 4.1 MMOL/L — SIGNIFICANT CHANGE UP (ref 3.5–5.3)
PROT SERPL-MCNC: 7.8 GM/DL — SIGNIFICANT CHANGE UP (ref 6–8.3)
PROT UR-MCNC: NEGATIVE MG/DL — SIGNIFICANT CHANGE UP
RBC CASTS # UR COMP ASSIST: 0 /HPF — SIGNIFICANT CHANGE UP (ref 0–4)
RSV RNA NPH QL NAA+NON-PROBE: SIGNIFICANT CHANGE UP
SALICYLATES SERPL-MCNC: <1.7 MG/DL — LOW (ref 2.8–20)
SARS-COV-2 RNA SPEC QL NAA+PROBE: SIGNIFICANT CHANGE UP
SODIUM SERPL-SCNC: 137 MMOL/L — SIGNIFICANT CHANGE UP (ref 135–145)
SOURCE RESPIRATORY: SIGNIFICANT CHANGE UP
SP GR SPEC: 1.01 — SIGNIFICANT CHANGE UP (ref 1–1.03)
SQUAMOUS # UR AUTO: 5 /HPF — SIGNIFICANT CHANGE UP (ref 0–5)
THC UR QL: NEGATIVE — SIGNIFICANT CHANGE UP
UROBILINOGEN FLD QL: 0.2 MG/DL — SIGNIFICANT CHANGE UP (ref 0.2–1)
WBC UR QL: 16 /HPF — HIGH (ref 0–5)

## 2025-04-09 PROCEDURE — 90792 PSYCH DIAG EVAL W/MED SRVCS: CPT | Mod: 95

## 2025-04-09 PROCEDURE — 93010 ELECTROCARDIOGRAM REPORT: CPT | Mod: 76

## 2025-04-09 NOTE — ED BEHAVIORAL HEALTH ASSESSMENT NOTE - HPI (INCLUDE ILLNESS QUALITY, SEVERITY, DURATION, TIMING, CONTEXT, MODIFYING FACTORS, ASSOCIATED SIGNS AND SYMPTOMS)
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  No h/o psych admits, 1 past SA by OD around age 23. Admits to having glass of wine with dinner and may get drunk with friends on weekends "not all the time".  Denies other substance abuse.    States  was away at [employer] party but returned home today.  States she has ongoing stress of having house under renovation and talk about trying to get pregnant this summer.  "Things I want but it's a lot".  States with  returning home she realized things were "back to routine" and she had to focus on the renovations, planning future family and work.  States she was overwhelmed and was crying and started having chest pain.  Pt aware these are symptoms of her anxiety (panic like symptoms) and she usually goes and takes a cold shower which helps her "snap out of it".   Went to bathroom, was in shower and states she impulsively put mouthful of advil in her mouth and then spit them out.  At same time,  came in to bathroom who helped her come to ED for evaluation.  Pt states "suicide attempt" but then later clarifies she doesn't think she wanted to die or thought advil would kill her and didn't really want to swallow them.      Pt admits she was in ED before for similar and "admitted for 6 hrs" (seems to be evaluated at [facility] ED and T&R).  Per that assessment, presented with SI related to feeling overwhelmed as she was recently , finishing school and planning to buy the house.  Had written goodbye letter to  and then came to ED on own instead of acting out.    States when anxious she has poor sleep, poor appetite, poor concentration.   Denies HI, A/VH or PI.  Denies h/o jarett but states she was diagnosed with depression in past.  Has never tried meds and does not want to .  Wants to reengage in therapy. 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  No h/o psych admits, 1 past SA by OD around age 23. Admits to having glass of wine with dinner and may get drunk with friends on weekends "not all the time".  Denies other substance abuse.    States  was away at bachelor party but returned home today.  States she has ongoing stress of having house under renovation and talk about trying to get pregnant this summer.  "Things I want but it's a lot".  States with  returning home she realized things were "back to routine" and she had to focus on the renovations, planning future family and work.  States she was overwhelmed and was crying and started having chest pain.  Pt aware these are symptoms of her anxiety (panic like symptoms) and she usually goes and takes a cold shower which helps her "snap out of it".   Went to bathroom, was in shower and states she impulsively put mouthful of advil in her mouth and then spit them out.  At same time,  came in to bathroom who helped her come to ED for evaluation.  Pt states "suicide attempt" but then later clarifies she doesn't think she wanted to die or thought advil would kill her and didn't really want to swallow them.      Pt admits she was in ED before for similar and "admitted for 6 hrs" (seems to be evaluated at [facility] ED and T&R).  Per that assessment, presented with SI related to feeling overwhelmed as she was recently , finishing school and planning to buy the house.  Had written goodbye letter to  and then came to ED on own instead of acting out.    States when anxious she has poor sleep, poor appetite, poor concentration.   Denies HI, A/VH or PI.  Denies h/o jarett but states she was diagnosed with depression in past.  Has never tried meds and does not want to .  Wants to reengage in therapy.     contacted by [facility] (See  note).    He supports her report of sadness and feeling overwhelmed easily.  H/o passive SI statements but usually talks to him and symptoms improve.  He agrees putting ibuprofen in mouth was not with suicidal intent.  Agrees to supervise/monitor her at home and bring her back if any self injury occurs.

## 2025-04-09 NOTE — ED BEHAVIORAL HEALTH ASSESSMENT NOTE - SAFETY PLAN ADDT'L DETAILS
Safety plan discussed with... Safety plan discussed with.../Education provided regarding environmental safety / lethal means restriction/Provision of National Suicide Prevention Lifeline 1-587-448-WHMR (8154)

## 2025-04-09 NOTE — ED BEHAVIORAL HEALTH ASSESSMENT NOTE - SUMMARY
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  No h/o psych admits, 1 past SA by OD around age 23. Admits to having glass of wine with dinner and may get drunk with friends on weekends "not all the time".  Denies other substance abuse.    States  was away at bachelor party but returned home today.  States she has ongoing stress of having house under renovation and talk about trying to get pregnant this summer.  "Things I want but it's a lot".  States with  returning home she realized things were "back to routine" and she had to focus on the renovations, planning future family and work.  States she was overwhelmed and was crying and started having chest pain.  Pt aware these are symptoms of her anxiety (panic like symptoms) and she usually goes and takes a cold shower which helps her "snap out of it".   Went to bathroom, was in shower and states she impulsively put mouthful of advil in her mouth and then spit them out.  At same time,  came in to bathroom who helped her come to ED for evaluation.  Pt states "suicide attempt" but then later clarifies she doesn't think she wanted to die or thought advil would kill her and didn't really want to swallow them.      Pt admits she was in ED before for similar and "admitted for 6 hrs" (seems to be evaluated at [facility] ED and T&R).  Per that assessment, presented with SI related to feeling overwhelmed as she was recently , finishing school and planning to buy the house.  Had written goodbye letter to  and then came to ED on own instead of acting out.    States when anxious she has poor sleep, poor appetite, poor concentration.   Denies HI, A/VH or PI.  Denies h/o jarett but states she was diagnosed with depression in past.  Has never tried meds and does not want to .  Wants to reengage in therapy.    Pt has h/o mood lability, impulsivity when anxious.  H/o good response to therapy.  Pt does not want psychotropics.

## 2025-04-09 NOTE — ED BEHAVIORAL HEALTH ASSESSMENT NOTE - NSBHSAALC_PSY_A_CORE FT
1 glass wine with dinner (BAL 12 on arrival); sometimes gets intoxicated with friends but not regularly

## 2025-04-09 NOTE — ED BEHAVIORAL HEALTH ASSESSMENT NOTE - RISK ASSESSMENT
risk: recent OD gesture, not in outpatient treatment  protective: supportive family, wants to reengage with therapy (but not meds)    Pt is not deemed to be a danger to self/others and is safe for outpt referrals/treatment.

## 2025-04-09 NOTE — ED ADULT NURSE NOTE - OBJECTIVE STATEMENT
Pt is 35 y/o, alert and oriented x3, presenting to ED c/o suicidal thoughts. Pt reports, "I've been feeling a lot of stress with buying a new house, a new marriage, and with work. I was feeling suicidal earlier and put about half a bottle of Pt is 33 y/o, alert and oriented x3, presenting to ED c/o suicidal thoughts. Pt reports, "I've been feeling a lot of stress with buying a new house, a new marriage, and with work. In the past, about a year ago, I had similar feelings of suicide and I checked myself in voluntarily to be evaluated. I was feeling suicidal earlier and put about half a bottle of Motrin in my mouth to hurt myself, but I did not swallow it. I do not feel suicidal now. I want to get help." Pt denies SI/HI/auditory hallucinations/ visual hallucinations at this time. Pt is calm and cooperative at this time. Pt has good hygiene and is pleasant. Pt reports h/o anxiety, but denies taking any daily medications. Pt denies chest pain, SOB, nausea, vomiting, dizziness, fevers. Continuous 1:1 initiated in EMS triage. Pt undressed and pt's  at bedside took clothes home. Pt's phone and 1 ring locked with security. EKG obtained and pt wanded by security.

## 2025-04-09 NOTE — ED BEHAVIORAL HEALTH ASSESSMENT NOTE - DETAILS
bullied in high school, dropped out and got GED, father left with pt was 8,  2019 see HPI, put handful of advil in mouth then spit them out today  aware see  Safety Assessment Note

## 2025-04-09 NOTE — ED ADULT NURSE NOTE - NSFALLUNIVINTERV_ED_ALL_ED
Bed/Stretcher in lowest position, wheels locked, appropriate side rails in place/Call bell, personal items and telephone in reach/Instruct patient to call for assistance before getting out of bed/chair/stretcher/Non-slip footwear applied when patient is off stretcher/[place] to call system/Physically safe environment - no spills, clutter or unnecessary equipment/Purposeful proactive rounding/Room/bathroom lighting operational, light cord in reach

## 2025-04-09 NOTE — ED ADULT NURSE REASSESSMENT NOTE - NS ED NURSE REASSESS COMMENT FT1
Pt reports feeling a lot better. Pt denies SI/HI/auditory or visual hallucinations at this time. VS as charted. POC updated with pt. Safety and comfort measures maintained.

## 2025-04-09 NOTE — ED BEHAVIORAL HEALTH NOTE - BEHAVIORAL HEALTH NOTE
COLLATERAL     ========================     NAME: Neri Maxwell     NUMBER: [phone]     RELATIONSHIP:      RELIABILITY: reliable     COMMENTS:           ========================     HPI     ========================     BASELINE FUNCTIONING: Pt is a 33 y/o female, domiciled with her . 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 Pt was previously connected with outpatient care but currently does not have a therapist or psychiatrist. Pt has no PMH and a PPHx of depression and anxiety. Pt has no previous IPP admissions.      DATE HPI STARTED: 4/8/2025     DECOMPENSATION: Collateral reports that pt has been feeling more sensitive and sadder lately. Collateral reports that pt expressed feeling alone and like everyone is against her. Pt and  had an argument this evening which led to her impulsively putting a handful of ibuprofens in her mouth but spit out right away. Collateral reports that pt has done this one other time before. Collateral does not believe pt has any plan or intentions to harm herself and feels safe with her returning home. Collateral reports that pt could use a new therapist since she terminated with her last one due to cost and feeling like it wasn't a good fit.      SUICIDALITY: Pt put a handful of ibuprofens in her mouth after argument with her , then spit them out     VIOLENCE: None reported     SUBSTANCE: None reported??               ========================     PAST PSYCHIATRIC HISTORY     ========================     DATE PAST PSYCHIATRIC HISTORY STARTED: Unknown     MAIN PSYCHIATRIC DIAGNOSIS: anxiety and depression     PSYCHIATRIC HOSPITALIZATIONS: No previous IPP admissions     PRIOR ILLNESS: None reported?     SUICIDALITY: None reported??     VIOLENCE: None reported??     SUBSTANCE USE: None reported??          ==============     OTHER HISTORY     ==============     CURRENT MEDICATION: ? None reported?     MEDICAL HISTORY: ? None reported?     ALLERGIES: None reported?     LEGAL ISSUES: None reported?     FIREARM ACCESS: No access to firearms or weapons     SOCIAL HISTORY: None reported?     FAMILY HISTORY: None reported?     DEVELOPMENTAL HISTORY: None reported

## 2025-04-09 NOTE — ED PROVIDER NOTE - OBJECTIVE STATEMENT
33 y/o F with h/o anxiety and depression not on medications presents for SI and placing ibuprofen tablets in mouth prior to arrival.  Pt notes  placing a handful of tablets in her mouth and then immediately spitting them out.  Patient denies taking any other medications other than 1 Tylenol earlier yesterday for toothache.  Patient has  at bedside.  Patient notes that she was evaluated at [facility] 2 years ago for SI but not admitted.  Will get medical clearance for psychiatric evaluation and treatment.

## 2025-04-09 NOTE — ED PROVIDER NOTE - PATIENT PORTAL LINK FT
You can access the FollowMyHealth Patient Portal offered by [facility] by registering at the following website: [email or web address]. By joining [employer]’s FollowMyHealth portal, you will also be able to view your health information using other applications (apps) compatible with our system.

## 2025-04-09 NOTE — ED ADULT NURSE REASSESSMENT NOTE - NS ED NURSE REASSESS COMMENT FT1
Assumed care of pt at 0300. pt awake, alert, AO x 4, calm, cooperative. No complaints at this time. Family at bedside. Constant observation maintained.

## 2025-04-09 NOTE — ED PROVIDER NOTE - CLINICAL SUMMARY MEDICAL DECISION MAKING FREE TEXT BOX
35 y/o F with h/o anxiety and depression not on medications presents for SI and placing ibuprofen tablets in mouth prior to arrival.  Pt notes  placing a handful of tablets in her mouth and then immediately spitting them out.  Patient denies taking any other medications other than 1 Tylenol earlier yesterday for toothache.  Patient has  at bedside.  Patient notes that she was evaluated at [facility] 2 years ago for SI but not admitted.  Will get medical clearance for psychiatric evaluation and treatment.